# Patient Record
Sex: MALE
[De-identification: names, ages, dates, MRNs, and addresses within clinical notes are randomized per-mention and may not be internally consistent; named-entity substitution may affect disease eponyms.]

---

## 2019-08-29 ENCOUNTER — HOSPITAL ENCOUNTER (EMERGENCY)
Dept: HOSPITAL 94 - ER | Age: 49
LOS: 1 days | Discharge: HOME | End: 2019-08-30
Payer: MEDICARE

## 2019-08-29 VITALS — BODY MASS INDEX: 30.28 KG/M2 | HEIGHT: 66 IN | WEIGHT: 188.39 LBS

## 2019-08-29 DIAGNOSIS — F17.200: ICD-10-CM

## 2019-08-29 DIAGNOSIS — J69.0: Primary | ICD-10-CM

## 2019-08-29 DIAGNOSIS — Z79.899: ICD-10-CM

## 2019-08-29 DIAGNOSIS — Z88.0: ICD-10-CM

## 2019-08-29 DIAGNOSIS — F12.90: ICD-10-CM

## 2019-08-29 LAB
ALBUMIN SERPL BCP-MCNC: 3.4 G/DL (ref 3.4–5)
ALBUMIN/GLOB SERPL: 0.8 {RATIO} (ref 1.1–1.5)
ALP SERPL-CCNC: 112 IU/L (ref 46–116)
ALT SERPL W P-5'-P-CCNC: 39 U/L (ref 12–78)
ANION GAP SERPL CALCULATED.3IONS-SCNC: 12 MMOL/L (ref 8–16)
APTT PPP: 30 SECONDS (ref 22–32)
AST SERPL W P-5'-P-CCNC: 16 U/L (ref 10–37)
BASOPHILS # BLD AUTO: 0.2 X10'3 (ref 0–0.2)
BASOPHILS NFR BLD AUTO: 0.8 % (ref 0–1)
BILIRUB SERPL-MCNC: 0.3 MG/DL (ref 0.1–1)
BUN SERPL-MCNC: 11 MG/DL (ref 7–18)
BUN/CREAT SERPL: 9 (ref 5.4–32)
CALCIUM SERPL-MCNC: 8.9 MG/DL (ref 8.5–10.1)
CHLORIDE SERPL-SCNC: 101 MMOL/L (ref 99–107)
CO2 SERPL-SCNC: 26.4 MMOL/L (ref 24–32)
CREAT SERPL-MCNC: 1.22 MG/DL (ref 0.6–1.1)
EOSINOPHIL # BLD AUTO: 0.2 X10'3 (ref 0–0.9)
EOSINOPHIL NFR BLD AUTO: 1.1 % (ref 0–6)
ERYTHROCYTE [DISTWIDTH] IN BLOOD BY AUTOMATED COUNT: 14 % (ref 11.5–14.5)
GFR SERPL CREATININE-BSD FRML MDRD: 63 ML/MIN
GLUCOSE SERPL-MCNC: 101 MG/DL (ref 70–104)
HCT VFR BLD AUTO: 44.4 % (ref 42–52)
HGB BLD-MCNC: 14.9 G/DL (ref 14–17.9)
LYMPHOCYTES # BLD AUTO: 3.9 X10'3 (ref 1.1–4.8)
LYMPHOCYTES NFR BLD AUTO: 17.9 % (ref 21–51)
MCH RBC QN AUTO: 30.9 PG (ref 27–31)
MCHC RBC AUTO-ENTMCNC: 33.4 G/DL (ref 33–36.5)
MCV RBC AUTO: 92.4 FL (ref 78–98)
MONOCYTES # BLD AUTO: 1.7 X10'3 (ref 0–0.9)
MONOCYTES NFR BLD AUTO: 7.7 % (ref 2–12)
NEUTROPHILS # BLD AUTO: 15.9 X10'3 (ref 1.8–7.7)
NEUTROPHILS NFR BLD AUTO: 72.5 % (ref 42–75)
PLATELET # BLD AUTO: 361 X10'3 (ref 140–440)
PMV BLD AUTO: 7.3 FL (ref 7.4–10.4)
POTASSIUM SERPL-SCNC: 3.6 MMOL/L (ref 3.5–5.1)
PROT SERPL-MCNC: 7.5 G/DL (ref 6.4–8.2)
RBC # BLD AUTO: 4.81 X10'6 (ref 4.7–6.1)
SODIUM SERPL-SCNC: 139 MMOL/L (ref 135–145)
WBC # BLD AUTO: 21.9 X10'3 (ref 4.5–11)

## 2019-08-29 PROCEDURE — 96367 TX/PROPH/DG ADDL SEQ IV INF: CPT

## 2019-08-29 PROCEDURE — 80053 COMPREHEN METABOLIC PANEL: CPT

## 2019-08-29 PROCEDURE — 70491 CT SOFT TISSUE NECK W/DYE: CPT

## 2019-08-29 PROCEDURE — 85610 PROTHROMBIN TIME: CPT

## 2019-08-29 PROCEDURE — 99285 EMERGENCY DEPT VISIT HI MDM: CPT

## 2019-08-29 PROCEDURE — 85025 COMPLETE CBC W/AUTO DIFF WBC: CPT

## 2019-08-29 PROCEDURE — 36415 COLL VENOUS BLD VENIPUNCTURE: CPT

## 2019-08-29 PROCEDURE — 71045 X-RAY EXAM CHEST 1 VIEW: CPT

## 2019-08-29 PROCEDURE — 85730 THROMBOPLASTIN TIME PARTIAL: CPT

## 2019-08-29 PROCEDURE — 99284 EMERGENCY DEPT VISIT MOD MDM: CPT

## 2019-08-29 PROCEDURE — 71260 CT THORAX DX C+: CPT

## 2019-08-29 PROCEDURE — 96366 THER/PROPH/DIAG IV INF ADDON: CPT

## 2019-08-29 PROCEDURE — 70360 X-RAY EXAM OF NECK: CPT

## 2019-08-29 PROCEDURE — 96365 THER/PROPH/DIAG IV INF INIT: CPT

## 2019-08-29 NOTE — NUR
I pulled the patient into triage, I gave him a Dr. Pepper to drink, and he 
chugged 1/2 the can without any difficulty. He said that he feels like 
something is stuck in the right side 3/4 of the way down his neck. He is not 
drooling, he is swallowing fine.

## 2019-08-30 VITALS — DIASTOLIC BLOOD PRESSURE: 51 MMHG | SYSTOLIC BLOOD PRESSURE: 105 MMHG

## 2024-08-17 ENCOUNTER — HOSPITAL ENCOUNTER (EMERGENCY)
Dept: HOSPITAL 94 - ER | Age: 54
Discharge: HOME | End: 2024-08-17
Payer: COMMERCIAL

## 2024-08-17 VITALS — BODY MASS INDEX: 21.65 KG/M2 | HEIGHT: 66 IN | WEIGHT: 134.7 LBS

## 2024-08-17 VITALS
DIASTOLIC BLOOD PRESSURE: 100 MMHG | RESPIRATION RATE: 16 BRPM | HEART RATE: 99 BPM | SYSTOLIC BLOOD PRESSURE: 132 MMHG | TEMPERATURE: 99.3 F | OXYGEN SATURATION: 96 %

## 2024-08-17 DIAGNOSIS — Z88.0: ICD-10-CM

## 2024-08-17 DIAGNOSIS — Z76.0: ICD-10-CM

## 2024-08-17 DIAGNOSIS — G89.29: Primary | ICD-10-CM

## 2024-08-17 DIAGNOSIS — Z79.899: ICD-10-CM

## 2024-08-17 DIAGNOSIS — F41.9: ICD-10-CM

## 2024-08-17 DIAGNOSIS — Z72.89: ICD-10-CM

## 2024-08-17 DIAGNOSIS — F12.90: ICD-10-CM

## 2024-08-17 DIAGNOSIS — F20.9: ICD-10-CM

## 2024-08-17 DIAGNOSIS — R45.1: ICD-10-CM

## 2024-08-17 PROCEDURE — 99281 EMR DPT VST MAYX REQ PHY/QHP: CPT

## 2025-05-02 ENCOUNTER — HOSPITAL ENCOUNTER (EMERGENCY)
Dept: HOSPITAL 94 - ER | Age: 55
Discharge: HOME | End: 2025-05-02
Payer: MEDICAID

## 2025-05-02 VITALS
SYSTOLIC BLOOD PRESSURE: 113 MMHG | OXYGEN SATURATION: 98 % | RESPIRATION RATE: 18 BRPM | HEART RATE: 86 BPM | DIASTOLIC BLOOD PRESSURE: 70 MMHG

## 2025-05-02 VITALS — TEMPERATURE: 98.4 F

## 2025-05-02 DIAGNOSIS — Z88.0: ICD-10-CM

## 2025-05-02 DIAGNOSIS — F20.9: ICD-10-CM

## 2025-05-02 DIAGNOSIS — Z98.890: ICD-10-CM

## 2025-05-02 DIAGNOSIS — Z59.00: ICD-10-CM

## 2025-05-02 DIAGNOSIS — Z02.89: Primary | ICD-10-CM

## 2025-05-02 DIAGNOSIS — F41.9: ICD-10-CM

## 2025-05-02 DIAGNOSIS — F17.200: ICD-10-CM

## 2025-05-02 DIAGNOSIS — Z72.89: ICD-10-CM

## 2025-05-02 DIAGNOSIS — Z79.899: ICD-10-CM

## 2025-05-02 DIAGNOSIS — F12.90: ICD-10-CM

## 2025-05-02 PROCEDURE — 99281 EMR DPT VST MAYX REQ PHY/QHP: CPT

## 2025-05-02 SDOH — ECONOMIC STABILITY - HOUSING INSECURITY: HOMELESSNESS UNSPECIFIED: Z59.00
